# Patient Record
Sex: MALE | Race: WHITE | URBAN - METROPOLITAN AREA
[De-identification: names, ages, dates, MRNs, and addresses within clinical notes are randomized per-mention and may not be internally consistent; named-entity substitution may affect disease eponyms.]

---

## 2021-01-31 ENCOUNTER — OFFICE VISIT (OUTPATIENT)
Dept: URGENT CARE | Facility: URGENT CARE | Age: 31
End: 2021-01-31
Payer: COMMERCIAL

## 2021-01-31 ENCOUNTER — ANCILLARY PROCEDURE (OUTPATIENT)
Dept: GENERAL RADIOLOGY | Facility: CLINIC | Age: 31
End: 2021-01-31
Attending: NURSE PRACTITIONER
Payer: COMMERCIAL

## 2021-01-31 VITALS
WEIGHT: 250 LBS | HEART RATE: 72 BPM | TEMPERATURE: 97.9 F | SYSTOLIC BLOOD PRESSURE: 135 MMHG | RESPIRATION RATE: 16 BRPM | DIASTOLIC BLOOD PRESSURE: 81 MMHG | OXYGEN SATURATION: 98 %

## 2021-01-31 DIAGNOSIS — M54.50 ACUTE LEFT-SIDED LOW BACK PAIN WITHOUT SCIATICA: ICD-10-CM

## 2021-01-31 DIAGNOSIS — M54.50 ACUTE LEFT-SIDED LOW BACK PAIN WITHOUT SCIATICA: Primary | ICD-10-CM

## 2021-01-31 PROCEDURE — 72100 X-RAY EXAM L-S SPINE 2/3 VWS: CPT | Performed by: RADIOLOGY

## 2021-01-31 PROCEDURE — 99204 OFFICE O/P NEW MOD 45 MIN: CPT | Performed by: NURSE PRACTITIONER

## 2021-01-31 RX ORDER — CYCLOBENZAPRINE HCL 10 MG
10 TABLET ORAL 3 TIMES DAILY PRN
Qty: 21 TABLET | Refills: 0 | Status: SHIPPED | OUTPATIENT
Start: 2021-01-31 | End: 2021-02-07

## 2021-01-31 NOTE — PROGRESS NOTES
SUBJECTIVE  HPI: Yang Thomason is a 30 year old male who presents for evaluation of low back pain  Symptoms began 3 day(s) ago after fall, slipped on ice, pain has been worsening.  Pain is located in the low back left region, with no radiation. At worst a 8 on a scale of 1-10.   Personal hx of back pain is recurrent self limited episodes of low back pain in the past.  Pain is exacerbated by: changing position.  Pain is relieved by: ice and rest  ASSOCIATED sx include: none.  Red flag symptoms: negative.    No past medical history on file.  No current outpatient medications on file.     Social History     Tobacco Use     Smoking status: Not on file   Substance Use Topics     Alcohol use: Not on file     Family history reviewed, nothing pertinent to visit    ROS:  CONSTITUTIONAL:NEGATIVE for fever, chills, change in weight  INTEGUMENTARY/SKIN: NEGATIVE for worrisome rashes, moles or lesions  EYES: NEGATIVE for vision changes or irritation  ENT/MOUTH: NEGATIVE for ear, mouth and throat problems  RESP:NEGATIVE for significant cough or SOB  CV: NEGATIVE for chest pain, palpitations or peripheral edema  GI: NEGATIVE for nausea, abdominal pain, heartburn, or change in bowel habits  : negative for dysuria, hematuria, decreased urinary stream, erectile dysfunction  MUSCULOSKELETAL: back pain  NEURO: NEGATIVE for weakness, dizziness or paresthesias  ENDOCRINE: NEGATIVE for temperature intolerance, skin/hair changes  HEME/ALLERGY/IMMUNE: NEGATIVE for bleeding problems  PSYCHIATRIC: NEGATIVE for changes in mood or affect    OBJECTIVE:  /81   Pulse 72   Temp 97.9  F (36.6  C) (Tympanic)   Resp 16   Wt 113.4 kg (250 lb)   SpO2 98%   Back examination: Back symmetric, no curvature. ROM normal. No CVA tenderness. Tenderness lower left of spine  STRAIGHT leg raise test: negative  GENERAL APPEARANCE: alert and no distress  RESP: lungs clear to auscultation - no rales, rhonchi or wheezes  CV: regular rates and rhythm,  normal S1 S2, no murmur noted  ABDOMEN:  soft, nontender, no HSM or masses and bowel sounds normal  NEURO: Normal strength and tone with no weakness or sensory deficit noted, reflexes normal   SKIN: no suspicious lesions or rashes    ASSESSMENT/IMPRESSION:  (M54.5) Acute left-sided low back pain without sciatica  (primary encounter diagnosis)    Plan:   XR Lumbar Spine 2/3 Views. No abnormalities noted on my review, pending radiology   [facility]  Referral to follow up,   cyclobenzaprine (FLEXERIL) 10 MG tablet TID PRN discussed can cause drowsiness  Rest ice heat stretching patient education given    Home treat and monitor symptoms, emergent symptoms as reviewed to ER    ANIKET Cárdenas CNP

## 2021-01-31 NOTE — LETTER
[facility] [place]  [address] [address]  Phone: [phone]    January 31, 2021        Yang Thomason  [address] [address] [address]          To whom it may concern:    RE: Yang Thomason    Patient may return to work after seeing orthopedic doctor.     Please contact me for questions or concerns.      Sincerely,        ANIKET Cárdenas CNP

## 2021-02-01 ENCOUNTER — THERAPY VISIT (OUTPATIENT)
Dept: PHYSICAL THERAPY | Facility: CLINIC | Age: 31
End: 2021-02-01
Payer: COMMERCIAL

## 2021-02-01 ENCOUNTER — OFFICE VISIT (OUTPATIENT)
Dept: ORTHOPEDICS | Facility: CLINIC | Age: 31
End: 2021-02-01
Payer: COMMERCIAL

## 2021-02-01 VITALS
DIASTOLIC BLOOD PRESSURE: 88 MMHG | WEIGHT: 250 LBS | SYSTOLIC BLOOD PRESSURE: 136 MMHG | BODY MASS INDEX: 37.89 KG/M2 | HEIGHT: 68 IN

## 2021-02-01 DIAGNOSIS — M54.50 ACUTE LEFT-SIDED LOW BACK PAIN WITHOUT SCIATICA: ICD-10-CM

## 2021-02-01 DIAGNOSIS — M54.59 MECHANICAL LOW BACK PAIN: Primary | ICD-10-CM

## 2021-02-01 DIAGNOSIS — M54.59 MECHANICAL LOW BACK PAIN: ICD-10-CM

## 2021-02-01 PROCEDURE — 99203 OFFICE O/P NEW LOW 30 MIN: CPT | Performed by: PEDIATRICS

## 2021-02-01 PROCEDURE — 97110 THERAPEUTIC EXERCISES: CPT | Mod: GP | Performed by: PHYSICAL THERAPIST

## 2021-02-01 PROCEDURE — 97161 PT EVAL LOW COMPLEX 20 MIN: CPT | Mod: GP | Performed by: PHYSICAL THERAPIST

## 2021-02-01 PROCEDURE — 97140 MANUAL THERAPY 1/> REGIONS: CPT | Mod: GP | Performed by: PHYSICAL THERAPIST

## 2021-02-01 ASSESSMENT — MIFFLIN-ST. JEOR: SCORE: 2058.49

## 2021-02-01 NOTE — LETTER
2/1/2021         RE: Yang Thomason  [address] [facility] [address]        Dear Colleague,    Thank you for referring your patient, Yang Thomason, to the [facility]. Please see a copy of my visit note below.    Sports Medicine Clinic Visit    PCP: No Ref-Primary, Physician    Yang Thomason is a 30 year old male who is seen  as a self referral AIC presenting with lower back pain    Injury: He reports on 1/29/21 he slipped and fell on his back. He has had chronic lower back on and off for 10 years. He has done physical therapy in the past for his pain. He reports pain has progressed since this last injury. He is unable to sit. He feels a pain and swelling in the left lower back. He denies radiating symptoms to his lower extremities. He was seen in the  on 1/31/21 and was prescribed flexeril.    Yang was asked to complete the Oswestry Low Back Disability Index and Ashanti Start Back screening tool.  today in the office.  Disability score: 76%.     Location of Pain: left, lower back  Duration of Pain: 3 day(s)  Rating of Pain at worst: 10/10  Rating of Pain Currently: 3/10  Symptoms are better with: Other medications: flexeril  Symptoms are worse with: extension, flexion and sitting  Additional Features:   Positive: pain in the lower back   Negative: swelling, bruising, popping, grinding, catching, locking, instability, paresthesias, numbness and weakness  Other evaluation and/or treatments so far consists of: physical therapy in the past, tries massage over the weekend  Prior History of related problems: Chronic lower back pain    Social History:     Review of Systems  Skin: no bruising, no swelling  Musculoskeletal: as above  Neurologic: no numbness, paresthesias  Remainder of review of systems is negative including constitutional, CV, pulmonary, GI, except as noted in HPI or medical history.    Patient's current problem list, past  "medical and surgical history, and family history were reviewed.    There is no problem list on file for this patient.    No past medical history on file.  No past surgical history on file.  No family history on file.    Objective  /88   Ht 1.727 m (5' 8\")   Wt 113.4 kg (250 lb)   BMI 38.01 kg/m      GENERAL APPEARANCE: healthy, alert and no distress   GAIT: NORMAL  SKIN: no suspicious lesions or rashes  HEENT: Sclera clear, anicteric  CV: good peripheral pulses  RESP: Breathing not labored  NEURO: Normal strength and tone, mentation intact and speech normal  PSYCH:  mentation appears normal and affect normal/bright    Low back exam:    Inspection:     no visible deformity in the low back       normal skin       normal vascular       normal lymphatic    Posture:      normal    Tender:     Lower left lumbar spine    Non Tender:     remainder of lumbar spine    ROM:      limited flexion due to pain       limited extension due to pain    Strength:     hip flexion 5/5 bilateral       knee extension 5/5 bilateral       ankle dorsiflexion 5/5 bilateral       ankle plantarflexion 5/5 bilateral       dorsiflexion of the great toe 5/5 bilateral       able to heel and toe walk    Reflexes:     patellar (L3, L4) symmetric normal       achilles tendons (S1) symmetric normal    Sensation:    grossly intact throughout lower extremities    Special tests:      straight leg raise left negative        straight leg raise right negative       neg (-) NURY  bilateral       neg (-) FADIR  Bilateral  - No pain with hip log roll, hip range of motion      Radiology  I visualized and reviewed these images with the patient  Xr Lumbar Spine 2/3 Views  Result Date: 1/31/2021  LUMBAR SPINE TWO TO THREE VIEWS 1/31/2021 11:53 AM INDICATION: Acute left-sided low back pain without sciatica. COMPARISON: None.   IMPRESSION: Five lumbar vertebral bodies. Normal vertebral body height and alignment. No fracture or subluxation. Mild degenerative " interspace narrowing and endplate irregularity T12-L1 and L1-L2. Mild degenerative interspace narrowing L5-S1. FELICIA HUMMEL MD    Assessment:  1. Mechanical low back pain    2. Acute left-sided low back pain without sciatica      Discussed likely contributing factors to mechanical back pain including muscular pain, SI joint pain.  Recommended physical therapy.  Would consider further work up pending clinical course.    Plan:  - Today's Plan of Care:  Topical Treatments: Ice or Heat  Over the counter medication: Patient's preferred OTC medication as directed on packaging.  Rehab: Physical Therapy: [facility] - [phone]  Work Letter - call if needing extension for 1-2 weeks    -We also discussed other future treatment options:  Lumbar MRI  Consideration of Medrol Dose Pack (oral prednisone)    Follow Up: 2-3 weeks    Concerning signs and symptoms were reviewed.  The patient expressed understanding of this management plan and all questions were answered at this time.    Lanie Rudd MD [facility]  Primary Care Sports Medicine  [facility]      Again, thank you for allowing me to participate in the care of your patient.        Sincerely,        Lanie Rudd MD

## 2021-02-01 NOTE — PATIENT INSTRUCTIONS
Plan:  - Today's Plan of Care:  Topical Treatments: Ice or Heat  Over the counter medication: Patient's preferred OTC medication as directed on packaging.  Rehab: Physical Therapy: [facility] - [phone]  Work Letter - call if needing extension for 1-2 weeks    -We also discussed other future treatment options:  Lumbar MRI  Consideration of Medrol Dose Pack (oral prednisone)    Follow Up: 2-3 weeks    If you have any further questions for your physician or physician s care team you can call [phone] and use option 3 to leave a voice message. Calls received during business hours will be returned same day.

## 2021-02-01 NOTE — PROGRESS NOTES
"Sports Medicine Clinic Visit    PCP: No Ref-Primary, Physician    Yang JAYME Thomason is a 30 year old male who is seen  as a self referral AIC presenting with lower back pain    Injury: He reports on 1/29/21 he slipped and fell on his back. He has had chronic lower back on and off for 10 years. He has done physical therapy in the past for his pain. He reports pain has progressed since this last injury. He is unable to sit. He feels a pain and swelling in the left lower back. He denies radiating symptoms to his lower extremities. He was seen in the  on 1/31/21 and was prescribed flexeril.    Yang was asked to complete the Oswestry Low Back Disability Index and Ashanti Start Back screening tool.  today in the office.  Disability score: 76%.     Location of Pain: left, lower back  Duration of Pain: 3 day(s)  Rating of Pain at worst: 10/10  Rating of Pain Currently: 3/10  Symptoms are better with: Other medications: flexeril  Symptoms are worse with: extension, flexion and sitting  Additional Features:   Positive: pain in the lower back   Negative: swelling, bruising, popping, grinding, catching, locking, instability, paresthesias, numbness and weakness  Other evaluation and/or treatments so far consists of: physical therapy in the past, tries massage over the weekend  Prior History of related problems: Chronic lower back pain    Social History:     Review of Systems  Skin: no bruising, no swelling  Musculoskeletal: as above  Neurologic: no numbness, paresthesias  Remainder of review of systems is negative including constitutional, CV, pulmonary, GI, except as noted in HPI or medical history.    Patient's current problem list, past medical and surgical history, and family history were reviewed.    There is no problem list on file for this patient.    No past medical history on file.  No past surgical history on file.  No family history on file.    Objective  /88   Ht 1.727 m (5' 8\")   Wt 113.4 kg " (250 lb)   BMI 38.01 kg/m      GENERAL APPEARANCE: healthy, alert and no distress   GAIT: NORMAL  SKIN: no suspicious lesions or rashes  HEENT: Sclera clear, anicteric  CV: good peripheral pulses  RESP: Breathing not labored  NEURO: Normal strength and tone, mentation intact and speech normal  PSYCH:  mentation appears normal and affect normal/bright    Low back exam:    Inspection:     no visible deformity in the low back       normal skin       normal vascular       normal lymphatic    Posture:      normal    Tender:     Lower left lumbar spine    Non Tender:     remainder of lumbar spine    ROM:      limited flexion due to pain       limited extension due to pain    Strength:     hip flexion 5/5 bilateral       knee extension 5/5 bilateral       ankle dorsiflexion 5/5 bilateral       ankle plantarflexion 5/5 bilateral       dorsiflexion of the great toe 5/5 bilateral       able to heel and toe walk    Reflexes:     patellar (L3, L4) symmetric normal       achilles tendons (S1) symmetric normal    Sensation:    grossly intact throughout lower extremities    Special tests:      straight leg raise left negative        straight leg raise right negative       neg (-) NURY  bilateral       neg (-) FADIR  Bilateral  - No pain with hip log roll, hip range of motion      Radiology  I visualized and reviewed these images with the patient  Xr Lumbar Spine 2/3 Views  Result Date: 1/31/2021  LUMBAR SPINE TWO TO THREE VIEWS 1/31/2021 11:53 AM INDICATION: Acute left-sided low back pain without sciatica. COMPARISON: None.   IMPRESSION: Five lumbar vertebral bodies. Normal vertebral body height and alignment. No fracture or subluxation. Mild degenerative interspace narrowing and endplate irregularity T12-L1 and L1-L2. Mild degenerative interspace narrowing L5-S1. FELICIA HUMMEL MD    Assessment:  1. Mechanical low back pain    2. Acute left-sided low back pain without sciatica      Discussed likely contributing factors to  mechanical back pain including muscular pain, SI joint pain.  Recommended physical therapy.  Would consider further work up pending clinical course.    Plan:  - Today's Plan of Care:  Topical Treatments: Ice or Heat  Over the counter medication: Patient's preferred OTC medication as directed on packaging.  Rehab: Physical Therapy: [facility] - [phone]  Work Letter - call if needing extension for 1-2 weeks    -We also discussed other future treatment options:  Lumbar MRI  Consideration of Medrol Dose Pack (oral prednisone)    Follow Up: 2-3 weeks    Concerning signs and symptoms were reviewed.  The patient expressed understanding of this management plan and all questions were answered at this time.    Lanie Rudd MD CAQ  Primary Care Sports Medicine  [facility]

## 2021-02-01 NOTE — LETTER
February 1, 2021      [facility]  [address] [address]        To Whom It May Concern,     Yang is under my care for a low back injury.  He should be off work for the week to do physical therapy, follow up will be at that time.      Sincerely,        Lanie Rudd MD

## 2021-02-01 NOTE — PROGRESS NOTES
[facility] Initial Evaluation  Subjective:  The history is provided by the patient.   Therapist Generated HPI Evaluation  Problem details: 1/29/2021.  Slipped and fell on back.  Legs went out fron under him and landed directly on back..         Type of problem:  Lumbar.    This is a new (history of LBP) condition.  Condition occurred with:  A fall/slip.  Where condition occurred: in the community.  Patient reports pain:  Lower lumbar spine, central lumbar spine and lumbar spine left.  Pain is described as sharp and aching and is constant.  Pain radiates to:  No radiation. Pain is the same all the time.  Since onset symptoms are unchanged.  Associated symptoms:  Loss of motion/stiffness. Symptoms are exacerbated by bending, lying down, walking and lifting  and relieved by muscle relaxants.  Special tests included:  X-ray (mild degen).    Restrictions due to condition include:  Currently not working due to present treatment.  Barriers include:  None as reported by patient.    Patient Health History  Yang JYAME Thomason being seen for LBP.     Problem began: 1/29/2021.   Problem occurred: slipped on ice   Pain is reported as 7/10 on pain scale.  General health as reported by patient is fair.  Pertinent medical history includes: none.   Red flags:  None as reported by patient.  Medical allergies: none.   Surgeries include:  None.    Current medications:  Muscle relaxants.    Current occupation .   Primary job tasks include:  Computer work, driving, lifting/carrying, prolonged standing, prolonged sitting and pushing/pulling.                                    Objective:    Gait:  Stiff lumbar with gait.  Guarded transfers.                   Lumbar/SI Evaluation  ROM:    AROM Lumbar:   Flexion:          Decreased 25%  Ext:                    Decreased 25%   Side Bend:        Left:  Decreased 25%    Right:  Decreased 25%  Rotation:           Left:     Right:   Side Glide:        Left:      Right:           Lumbar Myotomes:  normal (slight LB with hip resistance)            Lumbar DTR's:  normal        Lumbar Dermtomes:  normal                Neural Tension/Mobility:      Left side:SLR or Slump  negative.     Right side:   Slump or SLR  negative.   Lumbar Palpation:    Tenderness present at Left:    Quadratus Lumborum and Erector Spinae    Tenderness not present at Right:  Quadratus Lumborum or Erector Spinae      Spinal Segmental Conclusions:     Level: Hypo noted at L3, L2, L1, L5 and L4      SI joint/Sacrum:    negative FABERS bilat                                                       General     ROS    Assessment/Plan:    Patient is a 30 year old male with lumbar complaints.    Patient has the following significant findings with corresponding treatment plan.                Diagnosis 1:  LBP s/p fall  Pain -  manual therapy, self management, education, directional preference exercise and home program  Decreased ROM/flexibility - manual therapy, therapeutic exercise and home program  Decreased joint mobility - manual therapy, therapeutic exercise and home program  Decreased strength - therapeutic exercise, therapeutic activities and home program  Impaired muscle performance - neuro re-education and home program  Decreased function - therapeutic activities and home program    Therapy Evaluation Codes:   1) History comprised of:   Personal factors that impact the plan of care:      None.    Comorbidity factors that impact the plan of care are:      None.     Medications impacting care: Muscle relaxant.  2) Examination of Body Systems comprised of:   Body structures and functions that impact the plan of care:      Lumbar spine.   Activity limitations that impact the plan of care are:      Bending, Walking and Working.  3) Clinical presentation characteristics are:   Stable/Uncomplicated.  4) Decision-Making    Low complexity using standardized patient assessment instrument and/or measureable assessment  of functional outcome.  Cumulative Therapy Evaluation is: Low complexity.    Previous and current functional limitations:  (See Goal Flow Sheet for this information)    Short term and Long term goals: (See Goal Flow Sheet for this information)     Communication ability:  Patient appears to be able to clearly communicate and understand verbal and written communication and follow directions correctly.  Treatment Explanation - The following has been discussed with the patient:   RX ordered/plan of care  Anticipated outcomes  Possible risks and side effects  This patient would benefit from PT intervention to resume normal activities.   Rehab potential is good.    Frequency:  1 X week, once daily  Duration:  for 1 weeks  Discharge Plan:  Achieve all LTG.  Independent in home treatment program.  Reach maximal therapeutic benefit.    Pt only wanted to attend x 1    Please refer to the daily flowsheet for treatment today, total treatment time and time spent performing 1:1 timed codes.

## 2021-03-26 PROBLEM — M54.50 ACUTE LEFT-SIDED LOW BACK PAIN WITHOUT SCIATICA: Status: RESOLVED | Noted: 2021-02-01 | Resolved: 2021-03-26

## 2021-05-24 ENCOUNTER — RECORDS - HEALTHEAST (OUTPATIENT)
Dept: ADMINISTRATIVE | Facility: CLINIC | Age: 31
End: 2021-05-24